# Patient Record
Sex: FEMALE | Race: WHITE | NOT HISPANIC OR LATINO | URBAN - NONMETROPOLITAN AREA
[De-identification: names, ages, dates, MRNs, and addresses within clinical notes are randomized per-mention and may not be internally consistent; named-entity substitution may affect disease eponyms.]

---

## 2018-03-30 PROBLEM — H52.223: Noted: 2018-03-30

## 2019-06-14 ENCOUNTER — IMPORTED ENCOUNTER (OUTPATIENT)
Dept: URBAN - NONMETROPOLITAN AREA CLINIC 1 | Facility: CLINIC | Age: 34
End: 2019-06-14

## 2019-06-14 PROCEDURE — V2521 CNTCT LENS HYDROPHILIC TORIC: HCPCS

## 2019-06-14 PROCEDURE — 92015 DETERMINE REFRACTIVE STATE: CPT

## 2019-06-14 PROCEDURE — 92014 COMPRE OPH EXAM EST PT 1/>: CPT

## 2019-06-14 PROCEDURE — 92310 CONTACT LENS FITTING OU: CPT

## 2019-06-14 NOTE — PATIENT DISCUSSION
Astigmatism-Discussed diagnosis with patient. Updated spec Rx given. Recommend lens that will provide comfort as well as protect safety and health of eyes. Updated CL Rx given.

## 2020-06-18 ENCOUNTER — PREPPED CHART (OUTPATIENT)
Dept: RURAL CLINIC 1 | Facility: CLINIC | Age: 35
End: 2020-06-18

## 2020-06-18 ENCOUNTER — IMPORTED ENCOUNTER (OUTPATIENT)
Dept: URBAN - NONMETROPOLITAN AREA CLINIC 1 | Facility: CLINIC | Age: 35
End: 2020-06-18

## 2020-06-18 PROCEDURE — 92015 DETERMINE REFRACTIVE STATE: CPT

## 2020-06-18 PROCEDURE — 92310 CONTACT LENS FITTING OU: CPT

## 2020-06-18 PROCEDURE — 92014 COMPRE OPH EXAM EST PT 1/>: CPT

## 2020-06-18 PROCEDURE — V2521 CNTCT LENS HYDROPHILIC TORIC: HCPCS

## 2020-06-18 NOTE — PATIENT DISCUSSION
Astigmatism-Discussed diagnosis with patient. Updated spec Rx given. Recommend lens that will provide comfort as well as protect safety and health of eyes. Updated CL Rx given. CL wear-CLs fit and center well.-Stressed that patient should not sleep in CL. -Updated CL Rx given. -CL care and precautions given.

## 2022-01-28 ENCOUNTER — COMPREHENSIVE EXAM (OUTPATIENT)
Dept: RURAL CLINIC 1 | Facility: CLINIC | Age: 37
End: 2022-01-28

## 2022-01-28 DIAGNOSIS — H52.223: ICD-10-CM

## 2022-01-28 PROCEDURE — 92310 CONTACT LENS FITTING OU: CPT

## 2022-01-28 PROCEDURE — 92014 COMPRE OPH EXAM EST PT 1/>: CPT

## 2022-01-28 PROCEDURE — 92015 DETERMINE REFRACTIVE STATE: CPT

## 2022-01-28 ASSESSMENT — VISUAL ACUITY
OU_CC: J2
OS_CC: 20/40-1
OD_CC: 20/25-1

## 2022-01-28 ASSESSMENT — TONOMETRY
OS_IOP_MMHG: 14
OD_IOP_MMHG: 14

## 2022-04-10 ASSESSMENT — VISUAL ACUITY
OD_SC: 20/40
OD_SC: 20/25-2
OS_CC: 20/400
OS_SC: 20/20
OS_SC: 20/20

## 2022-04-10 ASSESSMENT — TONOMETRY
OD_IOP_MMHG: 13
OS_IOP_MMHG: 13
OD_IOP_MMHG: 13
OS_IOP_MMHG: 13

## 2023-10-25 ENCOUNTER — COMPREHENSIVE EXAM (OUTPATIENT)
Dept: RURAL CLINIC 1 | Facility: CLINIC | Age: 38
End: 2023-10-25

## 2023-10-25 DIAGNOSIS — H52.223: ICD-10-CM

## 2023-10-25 PROCEDURE — 92014 COMPRE OPH EXAM EST PT 1/>: CPT

## 2023-10-25 PROCEDURE — 92015 DETERMINE REFRACTIVE STATE: CPT

## 2023-10-25 PROCEDURE — 92310-E CONTACT LENS FITTING ESTABLISH PATIENT

## 2023-10-25 ASSESSMENT — VISUAL ACUITY
OS_CC: 20/30
OD_CC: 20/25
OU_CC: 20/20-1

## 2023-10-25 ASSESSMENT — TONOMETRY
OD_IOP_MMHG: 15
OS_IOP_MMHG: 15

## 2024-07-24 ENCOUNTER — EMERGENCY VISIT (OUTPATIENT)
Dept: RURAL CLINIC 1 | Facility: CLINIC | Age: 39
End: 2024-07-24

## 2024-07-24 DIAGNOSIS — H10.013: ICD-10-CM

## 2024-07-24 PROCEDURE — 99213 OFFICE O/P EST LOW 20 MIN: CPT

## 2024-07-24 ASSESSMENT — VISUAL ACUITY
OD_CC: 20/20
OS_CC: 20/25

## 2024-07-24 ASSESSMENT — TONOMETRY
OD_IOP_MMHG: 15
OS_IOP_MMHG: 15

## 2025-06-09 ENCOUNTER — COMPREHENSIVE EXAM (OUTPATIENT)
Age: 40
End: 2025-06-09

## 2025-06-09 DIAGNOSIS — H52.223: ICD-10-CM

## 2025-06-09 PROCEDURE — 92015 DETERMINE REFRACTIVE STATE: CPT

## 2025-06-09 PROCEDURE — 92014 COMPRE OPH EXAM EST PT 1/>: CPT

## 2025-06-09 PROCEDURE — 92310-1 LEVEL 1 SOFT LENS UPDATE
